# Patient Record
Sex: MALE | Race: WHITE | NOT HISPANIC OR LATINO | Employment: FULL TIME | URBAN - METROPOLITAN AREA
[De-identification: names, ages, dates, MRNs, and addresses within clinical notes are randomized per-mention and may not be internally consistent; named-entity substitution may affect disease eponyms.]

---

## 2023-03-16 PROCEDURE — 87101 SKIN FUNGI CULTURE: CPT | Performed by: PODIATRIST

## 2023-03-16 PROCEDURE — 87220 TISSUE EXAM FOR FUNGI: CPT | Performed by: PODIATRIST

## 2023-03-17 ENCOUNTER — LAB REQUISITION (OUTPATIENT)
Dept: LAB | Facility: HOSPITAL | Age: 38
End: 2023-03-17

## 2023-03-17 DIAGNOSIS — L60.1 ONYCHOLYSIS: ICD-10-CM

## 2023-03-17 DIAGNOSIS — M79.674 PAIN IN RIGHT TOE(S): ICD-10-CM

## 2023-03-17 DIAGNOSIS — B35.1 TINEA UNGUIUM: ICD-10-CM

## 2023-03-17 LAB — KOH PREP SPEC: NORMAL

## 2023-04-10 LAB — FUNGUS SKIN CULT: ABNORMAL

## 2023-07-15 ENCOUNTER — HOSPITAL ENCOUNTER (EMERGENCY)
Facility: HOSPITAL | Age: 38
Discharge: HOME | End: 2023-07-15
Attending: EMERGENCY MEDICINE
Payer: COMMERCIAL

## 2023-07-15 VITALS
HEIGHT: 76 IN | OXYGEN SATURATION: 98 % | BODY MASS INDEX: 37.14 KG/M2 | RESPIRATION RATE: 18 BRPM | HEART RATE: 57 BPM | SYSTOLIC BLOOD PRESSURE: 117 MMHG | WEIGHT: 305 LBS | DIASTOLIC BLOOD PRESSURE: 64 MMHG | TEMPERATURE: 97 F

## 2023-07-15 DIAGNOSIS — Z78.9 ALCOHOL USE: ICD-10-CM

## 2023-07-15 DIAGNOSIS — R11.2 NAUSEA & VOMITING: ICD-10-CM

## 2023-07-15 DIAGNOSIS — K22.6 MALLORY-WEISS TEAR: ICD-10-CM

## 2023-07-15 DIAGNOSIS — K29.20 ACUTE ALCOHOLIC GASTRITIS WITHOUT HEMORRHAGE: Primary | ICD-10-CM

## 2023-07-15 LAB
ALBUMIN SERPL-MCNC: 4 G/DL (ref 3.5–5)
ALBUMIN/GLOB SERPL: 1.2 RATIO (ref 1.1–2)
ALP SERPL-CCNC: 42 UNIT/L (ref 40–150)
ALT SERPL-CCNC: 53 UNIT/L (ref 0–55)
AST SERPL-CCNC: 31 UNIT/L (ref 5–34)
BASOPHILS # BLD AUTO: 0.03 X10(3)/MCL
BASOPHILS NFR BLD AUTO: 0.5 %
BILIRUBIN DIRECT+TOT PNL SERPL-MCNC: 0.5 MG/DL
BUN SERPL-MCNC: 12 MG/DL (ref 8.9–20.6)
CALCIUM SERPL-MCNC: 9.2 MG/DL (ref 8.4–10.2)
CHLORIDE SERPL-SCNC: 106 MMOL/L (ref 98–107)
CO2 SERPL-SCNC: 23 MMOL/L (ref 22–29)
CREAT SERPL-MCNC: 0.93 MG/DL (ref 0.73–1.18)
EOSINOPHIL # BLD AUTO: 0.04 X10(3)/MCL (ref 0–0.9)
EOSINOPHIL NFR BLD AUTO: 0.7 %
ERYTHROCYTE [DISTWIDTH] IN BLOOD BY AUTOMATED COUNT: 13.2 % (ref 11.5–17)
ETHANOL SERPL-MCNC: <10 MG/DL
GFR SERPLBLD CREATININE-BSD FMLA CKD-EPI: >60 MLS/MIN/1.73/M2
GLOBULIN SER-MCNC: 3.3 GM/DL (ref 2.4–3.5)
GLUCOSE SERPL-MCNC: 121 MG/DL (ref 74–100)
HCT VFR BLD AUTO: 45.3 % (ref 42–52)
HGB BLD-MCNC: 14.9 G/DL (ref 14–18)
IMM GRANULOCYTES # BLD AUTO: 0.02 X10(3)/MCL (ref 0–0.04)
IMM GRANULOCYTES NFR BLD AUTO: 0.3 %
LIPASE SERPL-CCNC: 26 U/L
LYMPHOCYTES # BLD AUTO: 1.84 X10(3)/MCL (ref 0.6–4.6)
LYMPHOCYTES NFR BLD AUTO: 31.1 %
MCH RBC QN AUTO: 29.6 PG (ref 27–31)
MCHC RBC AUTO-ENTMCNC: 32.9 G/DL (ref 33–36)
MCV RBC AUTO: 89.9 FL (ref 80–94)
MONOCYTES # BLD AUTO: 0.66 X10(3)/MCL (ref 0.1–1.3)
MONOCYTES NFR BLD AUTO: 11.2 %
NEUTROPHILS # BLD AUTO: 3.32 X10(3)/MCL (ref 2.1–9.2)
NEUTROPHILS NFR BLD AUTO: 56.2 %
PLATELET # BLD AUTO: 188 X10(3)/MCL (ref 130–400)
PMV BLD AUTO: 11.3 FL (ref 7.4–10.4)
POTASSIUM SERPL-SCNC: 3.9 MMOL/L (ref 3.5–5.1)
PROT SERPL-MCNC: 7.3 GM/DL (ref 6.4–8.3)
RBC # BLD AUTO: 5.04 X10(6)/MCL (ref 4.7–6.1)
SODIUM SERPL-SCNC: 141 MMOL/L (ref 136–145)
TROPONIN I SERPL-MCNC: <0.01 NG/ML (ref 0–0.04)
WBC # SPEC AUTO: 5.91 X10(3)/MCL (ref 4.5–11.5)

## 2023-07-15 PROCEDURE — 96361 HYDRATE IV INFUSION ADD-ON: CPT

## 2023-07-15 PROCEDURE — 82077 ASSAY SPEC XCP UR&BREATH IA: CPT | Performed by: EMERGENCY MEDICINE

## 2023-07-15 PROCEDURE — 96375 TX/PRO/DX INJ NEW DRUG ADDON: CPT

## 2023-07-15 PROCEDURE — C9113 INJ PANTOPRAZOLE SODIUM, VIA: HCPCS | Performed by: EMERGENCY MEDICINE

## 2023-07-15 PROCEDURE — 93005 ELECTROCARDIOGRAM TRACING: CPT

## 2023-07-15 PROCEDURE — 85025 COMPLETE CBC W/AUTO DIFF WBC: CPT | Performed by: EMERGENCY MEDICINE

## 2023-07-15 PROCEDURE — 83690 ASSAY OF LIPASE: CPT | Performed by: EMERGENCY MEDICINE

## 2023-07-15 PROCEDURE — 99284 EMERGENCY DEPT VISIT MOD MDM: CPT | Mod: 25

## 2023-07-15 PROCEDURE — 96374 THER/PROPH/DIAG INJ IV PUSH: CPT

## 2023-07-15 PROCEDURE — 25000003 PHARM REV CODE 250: Performed by: EMERGENCY MEDICINE

## 2023-07-15 PROCEDURE — 84484 ASSAY OF TROPONIN QUANT: CPT | Performed by: EMERGENCY MEDICINE

## 2023-07-15 PROCEDURE — 80053 COMPREHEN METABOLIC PANEL: CPT | Performed by: EMERGENCY MEDICINE

## 2023-07-15 PROCEDURE — 63600175 PHARM REV CODE 636 W HCPCS: Performed by: EMERGENCY MEDICINE

## 2023-07-15 RX ORDER — PANTOPRAZOLE SODIUM 40 MG/1
40 TABLET, DELAYED RELEASE ORAL DAILY
Qty: 30 TABLET | Refills: 0 | Status: SHIPPED | OUTPATIENT
Start: 2023-07-15 | End: 2023-08-14

## 2023-07-15 RX ORDER — ONDANSETRON 2 MG/ML
8 INJECTION INTRAMUSCULAR; INTRAVENOUS EVERY 4 HOURS PRN
Status: DISCONTINUED | OUTPATIENT
Start: 2023-07-15 | End: 2023-07-15 | Stop reason: HOSPADM

## 2023-07-15 RX ORDER — ONDANSETRON 4 MG/1
4 TABLET, ORALLY DISINTEGRATING ORAL EVERY 6 HOURS PRN
Qty: 12 TABLET | Refills: 0 | Status: SHIPPED | OUTPATIENT
Start: 2023-07-15

## 2023-07-15 RX ORDER — HYDROMORPHONE HYDROCHLORIDE 2 MG/ML
1 INJECTION, SOLUTION INTRAMUSCULAR; INTRAVENOUS; SUBCUTANEOUS
Status: COMPLETED | OUTPATIENT
Start: 2023-07-15 | End: 2023-07-15

## 2023-07-15 RX ORDER — PANTOPRAZOLE SODIUM 40 MG/10ML
80 INJECTION, POWDER, LYOPHILIZED, FOR SOLUTION INTRAVENOUS
Status: COMPLETED | OUTPATIENT
Start: 2023-07-15 | End: 2023-07-15

## 2023-07-15 RX ADMIN — PANTOPRAZOLE SODIUM 80 MG: 40 INJECTION, POWDER, FOR SOLUTION INTRAVENOUS at 08:07

## 2023-07-15 RX ADMIN — SODIUM CHLORIDE 1000 ML: 9 INJECTION, SOLUTION INTRAVENOUS at 08:07

## 2023-07-15 RX ADMIN — ONDANSETRON 8 MG: 2 INJECTION INTRAMUSCULAR; INTRAVENOUS at 08:07

## 2023-07-15 RX ADMIN — HYDROMORPHONE HYDROCHLORIDE 1 MG: 2 INJECTION, SOLUTION INTRAMUSCULAR; INTRAVENOUS; SUBCUTANEOUS at 08:07

## 2023-07-15 RX ADMIN — SODIUM CHLORIDE, POTASSIUM CHLORIDE, SODIUM LACTATE AND CALCIUM CHLORIDE 1000 ML: 600; 310; 30; 20 INJECTION, SOLUTION INTRAVENOUS at 09:07

## 2023-07-15 NOTE — ED PROVIDER NOTES
"Encounter Date: 7/15/2023       History     Chief Complaint   Patient presents with    Alcohol Intoxication     Drank last night   vomitting today     Morbidly obese 37-year-old male says he is trying to curtail his drinking recently.  But last night he had 3 beers and drank some " stuff "  they brought back from [place] he says he is embarrassed to be here.  Said he is never felt this bad in his life.  He has a bad headache he has nausea and vomiting he has minimum epigastric pain.    He says when you blood now there is some streaks of blood in his vomitus.  But not the 1st couple of times.  He says he thinks maybe he tore something      Past medical history significant for alcohol use no tobacco no drugs.  Has a history of diverticulosis and diverticulitis.    He is  working.  Mom and dad are both alive neither 1 has any cardiac history is patient tells me.    Asked him about immunizations.  He said he was in the [employer] he is had all kind of shots but none recently.  Asked him about COVID vaccinations he says he would prefer not to talk about that.      Primary care doctor Dr. Darío Argueta  He denies any cardiac history.  He denies any daily medications for any chronic problems.  He said he is never had any type of surgeries.    Review of patient's allergies indicates:  No Known Allergies  History reviewed. No pertinent past medical history.  History reviewed. No pertinent surgical history.  History reviewed. No pertinent family history.     Review of Systems   Constitutional:  Negative for activity change and appetite change.   HENT: Negative.     Eyes:  Negative for visual disturbance.   Respiratory: Negative.     Cardiovascular: Negative.    Gastrointestinal:  Positive for abdominal pain, nausea and vomiting.   Endocrine: Negative.    Genitourinary: Negative.    Musculoskeletal: Negative.    Skin: Negative.    Allergic/Immunologic: Negative.    Neurological: Negative.    Hematological: Negative.  " Refill sent   Psychiatric/Behavioral: Negative.     All other systems reviewed and are negative.    Physical Exam     Initial Vitals [07/15/23 0742]   BP Pulse Resp Temp SpO2   (!) 145/93 61 16 97.4 °F (36.3 °C) 97 %      MAP       --         Physical Exam    Nursing note and vitals reviewed.  Constitutional: He appears well-developed and well-nourished. He appears distressed.   This is a morbidly obese male he is awake he is oriented he is diaphoretic he says his head hurts in his stomach hurts.  He denies chest pain.  He says he is never felt this bad before.   HENT:   Head: Normocephalic and atraumatic.   Right Ear: Tympanic membrane and external ear normal.   Left Ear: Tympanic membrane and external ear normal.   Nose: Nose normal.   Mouth/Throat: Oropharynx is clear and moist and mucous membranes are normal. Oral lesions: moist muc memb.   Eyes: Conjunctivae and EOM are normal. Pupils are equal, round, and reactive to light.   Neck: Neck supple. No thyromegaly present. No tracheal deviation present. No JVD present.   Normal range of motion.  Cardiovascular:  Normal rate, regular rhythm, normal heart sounds and intact distal pulses.     Exam reveals no gallop and no friction rub.       No murmur heard.  Pulmonary/Chest: Breath sounds normal. No stridor. No respiratory distress. He has no wheezes. He has no rhonchi. He has no rales. He exhibits no tenderness.   Abdominal: Abdomen is soft. Bowel sounds are normal. He exhibits no distension and no mass. No signs of injury. There is no abdominal tenderness.   An obese abdomen no masses no guarding.  Minimum direct epigastric discomfort no rigidity   Genitourinary:    Genitourinary Comments: Patient has no CVA tenderness no tenderness in midline cervical thoracic lumbar sacral spine     Musculoskeletal:         General: No tenderness or edema. Normal range of motion.      Cervical back: Normal range of motion and neck supple.     Lymphadenopathy:     He has no cervical  adenopathy.   Neurological: He is alert and oriented to person, place, and time. He has normal strength. No cranial nerve deficit or sensory deficit. GCS score is 15. GCS eye subscore is 4. GCS verbal subscore is 5. GCS motor subscore is 6.   Skin: Skin is warm and dry. Capillary refill takes less than 2 seconds. No rash and no abscess noted. No erythema. No pallor.   Psychiatric: He has a normal mood and affect. His behavior is normal. Judgment and thought content normal.       ED Course   Procedures  Labs Reviewed   COMPREHENSIVE METABOLIC PANEL - Abnormal; Notable for the following components:       Result Value    Glucose Level 121 (*)     All other components within normal limits   CBC WITH DIFFERENTIAL - Abnormal; Notable for the following components:    MCHC 32.9 (*)     MPV 11.3 (*)     All other components within normal limits   LIPASE - Normal   ALCOHOL,MEDICAL (ETHANOL) - Normal   TROPONIN I - Normal   CBC W/ AUTO DIFFERENTIAL    Narrative:     The following orders were created for panel order CBC W/ AUTO DIFFERENTIAL.  Procedure                               Abnormality         Status                     ---------                               -----------         ------                     CBC with Differential[[id number]]        Abnormal            Final result                 Please view results for these tests on the individual orders.     EKG Readings: (Independently Interpreted)   Initial Reading: No STEMI. Previous EKG Date: No old EKGs to compare. Rhythm: Sinus Arrhythmia. Heart Rate: 60.   Normal sinus rhythm 60 beats per minute sinus arrhythmia noted no acute changes noted     Imaging Results    None          Medications   ondansetron injection 8 mg (8 mg Intravenous Given 7/15/23 0810)   lactated ringers bolus 1,000 mL (1,000 mLs Intravenous New Bag 7/15/23 7621)   sodium chloride 0.9% bolus 1,000 mL 1,000 mL ( Intravenous Stopped 7/15/23 0962)   pantoprazole injection 80 mg (80 mg Intravenous  Given 7/15/23 0811)   HYDROmorphone (PF) injection 1 mg (1 mg Intravenous Given 7/15/23 0811)     Medical Decision Making:   Initial Assessment:   Nausea and vomiting headache abdominal discomfort vomited several times no blood in the 1st couple of times we then small amount after  Said his wife drove him here he does not have to drive home could not drive this morning  Normocephalic atraumatic diaphoretic obese man looks like he does not feel very well not toxic does look to be very uncomfortable  Heart is regular rate and rhythm noted excess tachycardia lungs are clear to auscultation and percussion abdomen is obese good bowel sounds minimum direct epigastric tenderness extremities without clubbing or cyanosis edema neurological seems appropriate psychological he seems very remorseful for drinking at all last night.  Differential Diagnosis:   Kendal-Peck tear with small amount of blood after a couple of times of vomiting, gastritis, alcohol induced nausea vomiting and cephalgia, myocardial infarction, pancreatitis, dehydration,  Clinical Tests:   Lab Tests: Ordered and Reviewed       <> Summary of Lab: Negative lipase negative troponin negative hemoglobin hematocrit changes negative CBC and chemistry changes all benign  ED Management:  Evaluation 2 L of fluids ordered no history of CHF or heart problems per patient.  Protonix ordered for antacid treatment Dilaudid ordered for pain Zofran ordered for nausea    Patient is absorbing his 2 L already felt much better after the 1st.  Hydrating then hopefully discharge him home with Zofran and Protonix  MDM  Problems addressed  Co-morbidities and/or factors adding to the complexity or risk for the patient:  Alcohol use last night morbid obesity  Problems addressed:  Nausea vomiting small amount of blood after vomiting a couple of times, severe headache post alcohol use last night  Acute problem/illness or progression/exacerbation of chronic problem with potential  threat to life/bodily dysfunction?:  Pancreatitis possibility  Differential diagnoses/problems considered: see above     Amount and/or Complexity of Data Reviewed  Independent Historian: none (see above for summary)  External Data Reviewed: notes from previous ED visits and prior labs (see above for summary)  Risk and benefits of testing: discussed   Labs: Labs: ordered and reviewed  Radiology:Radiology: ordered and independent interpretation performed (see above or ED course)  ECG/medicine tests:Radiology: ordered and independent interpretation performed (see above or ED course)  none    Risk  Parenteral controlled substances ,shared decision-making,    Critical Care  none            ED Course as of 07/15/23 0951   Sat Jul 15, 2023   0846 Troponin is negative  EKG is benign  Lipase is negative [DM]   0846 I am told that chemistries are NOW  back up and now we will get a chemistries and lipase run [DM]   0906 Chemistries have returned negative also [DM]   0936 Patient says he is doing much better now.  Lab work has returned benign.  Negative troponin negative lipase negative chemistries [DM]   0947 Patient admits he is feeling better I talked to him about his benign lab work when finish the 2 L of fluid who will be able to go home I discussed the Protonix I discussed the Zofran I discussed the of alcohol with the patient [DM]      ED Course User Index  [DM] Fito Hurt MD                   Clinical Impression:   Final diagnoses:  [R11.2] Nausea & vomiting  [Z78.9] Alcohol use  [K29.20] Acute alcoholic gastritis without hemorrhage (Primary)  [K22.6] Kendal-Peck tear - Suspected after small Amount of blood that came up after vomiting a couple of times        ED Disposition Condition    Discharge Stable          ED Prescriptions       Medication Sig Dispense Start Date End Date Auth. Provider    ondansetron (ZOFRAN-ODT) 4 MG TbDL Take 1 tablet (4 mg total) by mouth every 6 (six) hours as needed. 12 tablet  7/15/2023 -- Fito Hurt MD    pantoprazole (PROTONIX) 40 MG tablet Take 1 tablet (40 mg total) by mouth once daily. for 30 doses 30 tablet 7/15/2023 8/14/2023 Fito Hurt MD          Follow-up Information       Follow up With Specialties Details Why Contact Info    Darío Argueta MD Family Medicine In 2 days As needed [address] [place] [address] [id number] [phone]               Fito Hurt MD  07/15/23 0808       Fito Hurt MD  07/15/23 0978

## 2023-07-15 NOTE — DISCHARGE INSTRUCTIONS
No more alcohol, drink lots of extra fluid this weekend no solid food for at least the next 5-6 hours    Take the Protonix once a day for the stomach uses Zofran prescription if needed for nausea or vomiting    Stay in a cool environment continue to self hydrate    Return to work or school on Monday

## 2023-07-15 NOTE — Clinical Note
"Angel Ortega" Dior was seen and treated in our emergency department on 7/15/2023.  He may return to work on 07/17/2023.       If you have any questions or concerns, please don't hesitate to call.      Fito Hurt MD"